# Patient Record
Sex: FEMALE | Race: WHITE | URBAN - METROPOLITAN AREA
[De-identification: names, ages, dates, MRNs, and addresses within clinical notes are randomized per-mention and may not be internally consistent; named-entity substitution may affect disease eponyms.]

---

## 2023-01-11 ENCOUNTER — WEB ENCOUNTER (OUTPATIENT)
Dept: URBAN - METROPOLITAN AREA CLINIC 40 | Facility: CLINIC | Age: 38
End: 2023-01-11

## 2023-01-19 ENCOUNTER — OFFICE VISIT (OUTPATIENT)
Dept: URBAN - METROPOLITAN AREA CLINIC 74 | Facility: CLINIC | Age: 38
End: 2023-01-19
Payer: COMMERCIAL

## 2023-01-19 ENCOUNTER — LAB OUTSIDE AN ENCOUNTER (OUTPATIENT)
Dept: URBAN - METROPOLITAN AREA CLINIC 74 | Facility: CLINIC | Age: 38
End: 2023-01-19

## 2023-01-19 VITALS
SYSTOLIC BLOOD PRESSURE: 118 MMHG | OXYGEN SATURATION: 98 % | HEIGHT: 67 IN | TEMPERATURE: 97.2 F | DIASTOLIC BLOOD PRESSURE: 68 MMHG | HEART RATE: 92 BPM | BODY MASS INDEX: 20 KG/M2 | WEIGHT: 127.4 LBS

## 2023-01-19 DIAGNOSIS — R19.4 CHANGE IN BOWEL HABIT: ICD-10-CM

## 2023-01-19 DIAGNOSIS — R63.4 ABNORMAL WEIGHT LOSS: ICD-10-CM

## 2023-01-19 DIAGNOSIS — R10.12 LUQ ABDOMINAL PAIN: ICD-10-CM

## 2023-01-19 PROCEDURE — 99203 OFFICE O/P NEW LOW 30 MIN: CPT | Performed by: INTERNAL MEDICINE

## 2023-01-19 PROCEDURE — 99243 OFF/OP CNSLTJ NEW/EST LOW 30: CPT | Performed by: INTERNAL MEDICINE

## 2023-01-19 RX ORDER — DICYCLOMINE HYDROCHLORIDE 10 MG/1
1 TABLET CAPSULE ORAL
Qty: 120 TABLET | Refills: 1 | OUTPATIENT
Start: 2023-01-19 | End: 2023-03-20

## 2023-01-19 NOTE — HPI-TODAY'S VISIT:
Ms. Mancuso is a 37-year-old white female seen in consultation at request of Dr. Devin Kraus for abnormal weight loss.  A copy of this report will be sent to the referring provider.  Ms. Mancuso states that she usually would lose weight in the summer months but then pick it back up once the time got colder.  This did not occur last year.  Since May of last year she is gone from 165 pounds to 125 pounds.  She is noted since October a decrease in appetite.  She initially had some dry heaves around that time but that is subsided.  Last November she noted that she has had a change in her bowels.  She has gone from 2 bowel movements a day to 1 stool but it is pasty in consistency.  No blood in the stool or melena.  He is noting occasional sharp left-sided abdominal pain in the upper quadrant.  He can last 15 minutes.  Usually happens after she eats but not necessarily associated with meals.  There is no nausea or vomiting.  She denies any reflux currently.  She states that she had at as a teenager but then when she stopped drinking soda it subsided.  She also gives a history several years ago of having an evaluation for autoimmune disease due to some skin lesions that appeared like ringworm.  He states she had a work-up with labs that was negative for lupus.  She saw dermatologist that did a biopsy that was nondiagnostic.  He does have a family history of Crohn's disease in the paternal uncle as well as questionable colitis in her parents.  She had lab work done with her primary care physician which included CBC, CMP, thyroid studies, amylase and lipase.  He was noted to have an elevated white count.  PCP has referred her to hematology for evaluation of this.

## 2023-01-20 ENCOUNTER — LAB OUTSIDE AN ENCOUNTER (OUTPATIENT)
Dept: URBAN - METROPOLITAN AREA CLINIC 40 | Facility: CLINIC | Age: 38
End: 2023-01-20

## 2023-01-25 ENCOUNTER — WEB ENCOUNTER (OUTPATIENT)
Dept: URBAN - METROPOLITAN AREA CLINIC 74 | Facility: CLINIC | Age: 38
End: 2023-01-25

## 2023-01-25 LAB
CALPROTECTIN, STOOL - QDX: (no result)
FECAL FAT, QUALITATIVE: (no result)
GASTROINTESTINAL PATHOGEN: (no result)
H. PYLORI (EIA) - QDX: NEGATIVE
PANCREATICELASTASE ELISA, STOOL: (no result)

## 2023-01-26 LAB
ANA SCREEN, IFA: POSITIVE
ANCA SCREEN: NEGATIVE
C-REACTIVE PROTEIN, QUANT: <0.2
INTERPRETATION: (no result)
MYELOPEROXIDASE ANTIBODY: <1
PROTEINASE-3 ANTIBODY: <1
RHEUMATOID FACTOR: <14
SACCHAROMYCES CEREVISIAE AB (ASCA) (IGA): 5.4
SACCHAROMYCES CEREVISIAE AB (ASCA) (IGG): 11.9
SJOGREN'S ANTIBODY (SS-A): (no result)
SJOGREN'S ANTIBODY (SS-B): (no result)

## 2023-02-13 ENCOUNTER — LAB OUTSIDE AN ENCOUNTER (OUTPATIENT)
Dept: URBAN - METROPOLITAN AREA CLINIC 40 | Facility: CLINIC | Age: 38
End: 2023-02-13

## 2023-02-13 ENCOUNTER — OFFICE VISIT (OUTPATIENT)
Dept: URBAN - METROPOLITAN AREA CLINIC 40 | Facility: CLINIC | Age: 38
End: 2023-02-13
Payer: COMMERCIAL

## 2023-02-13 ENCOUNTER — DASHBOARD ENCOUNTERS (OUTPATIENT)
Age: 38
End: 2023-02-13

## 2023-02-13 VITALS
BODY MASS INDEX: 20.56 KG/M2 | HEIGHT: 67 IN | HEART RATE: 87 BPM | DIASTOLIC BLOOD PRESSURE: 80 MMHG | WEIGHT: 131 LBS | SYSTOLIC BLOOD PRESSURE: 136 MMHG

## 2023-02-13 DIAGNOSIS — R63.4 ABNORMAL WEIGHT LOSS: ICD-10-CM

## 2023-02-13 DIAGNOSIS — R19.4 CHANGE IN BOWEL HABIT: ICD-10-CM

## 2023-02-13 PROCEDURE — 99214 OFFICE O/P EST MOD 30 MIN: CPT | Performed by: INTERNAL MEDICINE

## 2023-02-13 RX ORDER — POLYETHYLENE GLYCOL 3350, SODIUM SULFATE, SODIUM CHLORIDE, POTASSIUM CHLORIDE, ASCORBIC ACID, SODIUM ASCORBATE 140-9-5.2G
AS DIRECTED KIT ORAL ONCE
Qty: 1 | Refills: 0 | OUTPATIENT
Start: 2023-02-13 | End: 2023-02-14

## 2023-02-13 RX ORDER — DICYCLOMINE HYDROCHLORIDE 10 MG/1
1 TABLET CAPSULE ORAL
Qty: 120 TABLET | Refills: 1 | Status: DISCONTINUED | COMMUNITY
Start: 2023-01-19 | End: 2023-03-20

## 2023-02-13 NOTE — HPI-TODAY'S VISIT:
Ms. Mancuso presents to clinic for follow-up.  She was initially seen on January 19 due to weight loss, left-sided abdominal pain and change in her bowels.  We did labs, stools and a CT scan.  Labs showed a negative IBD serology.  Her JIMMIE was positive but at a low titer of 1-40 and 1-80.  Stools were unremarkable for infection or malabsorption.  CT scan in February showed a lesion in the right hepatic lobe that was possibly a branch of the hepatic vein but MRI was suggested for further work-up.  At her last appointment she was given prescription for dicyclomine.  She never picked this up.  She also was given information with the FODMAP diet.  She states that has helped.  She is no longer nauseated.  She is gained back some weight.  She ate [employer] yesterday and had some issues with her bowel movements this morning.  She still feeling significantly fatigued.  She notes incomplete evacuation when she does have bowel movements.  She there is no blood in the stool.  She is also noted some back pain of late.

## 2023-03-02 ENCOUNTER — OFFICE VISIT (OUTPATIENT)
Dept: URBAN - METROPOLITAN AREA CLINIC 74 | Facility: CLINIC | Age: 38
End: 2023-03-02

## 2023-04-14 ENCOUNTER — OFFICE VISIT (OUTPATIENT)
Dept: URBAN - METROPOLITAN AREA SURGERY CENTER 30 | Facility: SURGERY CENTER | Age: 38
End: 2023-04-14